# Patient Record
Sex: FEMALE | Race: WHITE
[De-identification: names, ages, dates, MRNs, and addresses within clinical notes are randomized per-mention and may not be internally consistent; named-entity substitution may affect disease eponyms.]

---

## 2020-03-21 ENCOUNTER — HOSPITAL ENCOUNTER (EMERGENCY)
Dept: HOSPITAL 62 - ER | Age: 45
LOS: 1 days | Discharge: HOME | End: 2020-03-22
Payer: COMMERCIAL

## 2020-03-21 DIAGNOSIS — R42: Primary | ICD-10-CM

## 2020-03-21 DIAGNOSIS — I10: ICD-10-CM

## 2020-03-21 DIAGNOSIS — J45.909: ICD-10-CM

## 2020-03-21 DIAGNOSIS — R06.02: ICD-10-CM

## 2020-03-21 DIAGNOSIS — Z79.899: ICD-10-CM

## 2020-03-21 DIAGNOSIS — R07.9: ICD-10-CM

## 2020-03-21 PROCEDURE — 96375 TX/PRO/DX INJ NEW DRUG ADDON: CPT

## 2020-03-21 PROCEDURE — 36415 COLL VENOUS BLD VENIPUNCTURE: CPT

## 2020-03-21 PROCEDURE — 83735 ASSAY OF MAGNESIUM: CPT

## 2020-03-21 PROCEDURE — 83880 ASSAY OF NATRIURETIC PEPTIDE: CPT

## 2020-03-21 PROCEDURE — 80053 COMPREHEN METABOLIC PANEL: CPT

## 2020-03-21 PROCEDURE — 96374 THER/PROPH/DIAG INJ IV PUSH: CPT

## 2020-03-21 PROCEDURE — 99284 EMERGENCY DEPT VISIT MOD MDM: CPT

## 2020-03-21 PROCEDURE — 85025 COMPLETE CBC W/AUTO DIFF WBC: CPT

## 2020-03-21 PROCEDURE — 71046 X-RAY EXAM CHEST 2 VIEWS: CPT

## 2020-03-21 PROCEDURE — 84703 CHORIONIC GONADOTROPIN ASSAY: CPT

## 2020-03-21 PROCEDURE — 93005 ELECTROCARDIOGRAM TRACING: CPT

## 2020-03-21 PROCEDURE — 93010 ELECTROCARDIOGRAM REPORT: CPT

## 2020-03-21 PROCEDURE — 71275 CT ANGIOGRAPHY CHEST: CPT

## 2020-03-21 PROCEDURE — 84484 ASSAY OF TROPONIN QUANT: CPT

## 2020-03-21 PROCEDURE — 84443 ASSAY THYROID STIM HORMONE: CPT

## 2020-03-21 NOTE — ER DOCUMENT REPORT
ED Medical Screen (RME)





- General


Chief Complaint: Shortness Of Breath


Stated Complaint: SHORTNESS OF BREATH, IRREGULAR PULSE


Time Seen by Provider: 20 23:39


Primary Care Provider: 


CT ALBARRAN MD [Primary Care Provider] - Follow up as needed


Notes: 





HPI: 44-year-old female presenting for chest heaviness that began when she went 

to bed tonight with a sensation of her heart beating irregularly and quickly.  

Reports shortness of breath worsened with exertional activity tonight after 

onset of events.  Patient felt fine all day, no fever or other illness.  She 

states she had a cough last week which is improving.  Heaviness does not radiate

into the back or neck.





I have greeted and performed a rapid initial assessment of this patient.  A 

comprehensive ED assessment and evaluation of the patient, analysis of test 

results and completion of the medical decision making process will be conducted 

by additional ED providers








PHYSICAL EXAMINATION:





Lung sounds are relatively clear to auscultation, she is tachycardic with an 

auscultated heart rate of 116 at rest.  Does not become dyspneic with speaking.





- Related Data


Allergies/Adverse Reactions: 


                                        





oxycodone HCl [From OxyContin] Allergy (Unknown, Verified 10/12/12 11:10)


   











Past Medical History


Pulmonary Medical History: Reports: Hx Asthma


Past Surgical History: Reports: Hx  Section





Physical Exam





- Vital signs


Vitals: 





                                        











Temp Pulse Resp BP Pulse Ox


 


 98.1 F   110 H  17   159/114 H  97 


 


 20 23:34  20 23:34  20 23:34  20 23:34  20 23:34














Course





- Vital Signs


Vital signs: 





                                        











Temp Pulse Resp BP Pulse Ox


 


 98.1 F   110 H  17   159/114 H  97 


 


 20 23:34  20 23:34  20 23:34  20 23:34  20 23:34














Doctor's Discharge





- Discharge


Referrals: 


CT ALBARRAN MD [Primary Care Provider] - Follow up as needed

## 2020-03-22 VITALS — SYSTOLIC BLOOD PRESSURE: 148 MMHG | DIASTOLIC BLOOD PRESSURE: 96 MMHG

## 2020-03-22 LAB
ADD MANUAL DIFF: NO
ALBUMIN SERPL-MCNC: 4.3 G/DL (ref 3.5–5)
ALP SERPL-CCNC: 76 U/L (ref 38–126)
ANION GAP SERPL CALC-SCNC: 10 MMOL/L (ref 5–19)
AST SERPL-CCNC: 17 U/L (ref 14–36)
BASOPHILS # BLD AUTO: 0.1 10^3/UL (ref 0–0.2)
BASOPHILS NFR BLD AUTO: 0.7 % (ref 0–2)
BILIRUB DIRECT SERPL-MCNC: 0.3 MG/DL (ref 0–0.4)
BILIRUB SERPL-MCNC: 0.3 MG/DL (ref 0.2–1.3)
BUN SERPL-MCNC: 18 MG/DL (ref 7–20)
CALCIUM: 9.6 MG/DL (ref 8.4–10.2)
CHLORIDE SERPL-SCNC: 101 MMOL/L (ref 98–107)
CO2 SERPL-SCNC: 26 MMOL/L (ref 22–30)
EOSINOPHIL # BLD AUTO: 0.2 10^3/UL (ref 0–0.6)
EOSINOPHIL NFR BLD AUTO: 1.8 % (ref 0–6)
ERYTHROCYTE [DISTWIDTH] IN BLOOD BY AUTOMATED COUNT: 14.6 % (ref 11.5–14)
GLUCOSE SERPL-MCNC: 108 MG/DL (ref 75–110)
HCT VFR BLD CALC: 43.6 % (ref 36–47)
HGB BLD-MCNC: 15.1 G/DL (ref 12–15.5)
LYMPHOCYTES # BLD AUTO: 3.4 10^3/UL (ref 0.5–4.7)
LYMPHOCYTES NFR BLD AUTO: 27.3 % (ref 13–45)
MCH RBC QN AUTO: 28.8 PG (ref 27–33.4)
MCHC RBC AUTO-ENTMCNC: 34.6 G/DL (ref 32–36)
MCV RBC AUTO: 83 FL (ref 80–97)
MONOCYTES # BLD AUTO: 0.8 10^3/UL (ref 0.1–1.4)
MONOCYTES NFR BLD AUTO: 6.3 % (ref 3–13)
NEUTROPHILS # BLD AUTO: 7.8 10^3/UL (ref 1.7–8.2)
NEUTS SEG NFR BLD AUTO: 63.9 % (ref 42–78)
PLATELET # BLD: 282 10^3/UL (ref 150–450)
POTASSIUM SERPL-SCNC: 4.3 MMOL/L (ref 3.6–5)
PROT SERPL-MCNC: 7.6 G/DL (ref 6.3–8.2)
RBC # BLD AUTO: 5.25 10^6/UL (ref 3.72–5.28)
TOTAL CELLS COUNTED % (AUTO): 100 %
WBC # BLD AUTO: 12.3 10^3/UL (ref 4–10.5)

## 2020-03-22 NOTE — ER DOCUMENT REPORT
ED General





- General


Chief Complaint: Chest Pain


Stated Complaint: SHORTNESS OF BREATH, IRREGULAR PULSE


Time Seen by Provider: 20 23:39


Primary Care Provider: 


[facility] [Provider Group] - Follow up as needed


Mode of Arrival: Ambulatory


Information source: Patient


Notes: 





44-year-old woman presents to the emergency department with a complaint of chest

heaviness, feeling as though it is difficult to get her breath and rapid 

heartbeat.  She states that symptoms began when she laid down to go to bed 

tonight.  She has never had similar episodes in the past.  She does have a 

history of asthma however, states that this feels different.  She denies chest 

pain or associated dizziness, lightheadedness.  She presents to the emergency 

department for further evaluation and treatment.





- Related Data


Allergies/Adverse Reactions: 


                                        





oxycodone HCl [From OxyContin] Allergy (Unknown, Verified 10/12/12 11:10)


   


adhesive Allergy (Verified 20 23:42)


   











Past Medical History





- Social History


Smoking Status: Never Smoker


Chew tobacco use (# tins/day): No


Frequency of alcohol use: None


Drug Abuse: None


Family History: Reviewed & Not Pertinent


Patient has suicidal ideation: No


Patient has homicidal ideation: No


Pulmonary Medical History: Reports: Hx Asthma


Past Surgical History: Reports: Hx  Section





Review of Systems





- Review of Systems


Notes: 





Constitutional: Negative for fever.


HENT: Negative for sore throat.


Eyes: Negative for visual changes.


Cardiovascular: + Chest pressure


Respiratory: + Shortness of breath.


Gastrointestinal: Negative for abdominal pain, vomiting or diarrhea.


Genitourinary: Negative for dysuria.


Musculoskeletal: Negative for back pain.


Skin: Negative for rash.


Neurological: Negative for headaches, weakness or numbness.





10 point ROS negative except as marked above and in HPI.





Physical Exam





- Vital signs


Vitals: 


                                        











Temp Pulse Resp BP Pulse Ox


 


 98.1 F   110 H  17   159/114 H  97 


 


 20 23:34  20 23:34  20 23:34  20 23:34  20 23:34














- Notes


Notes: 





PHYSICAL EXAMINATION:


 


Physical Exam:


General: Well-nourished well-developed 44-year-old woman in no acute distress


HEENT: NC/AT, pupils equal round and reactive to light, MM moist,nares clear, 

oropharynx clear, airway patent


Neck: supple, no adenopathy, no masses.  Good range of motion


Lungs: clear, no wheezing, no rales no rhonchi


CVS: Tachycardic rate and rhythm no murmur gallop or rub


Abdomen: Soft, active, nontender, no masses, no hepatosplenomegaly


Ext:   No edema, clubbing or cyanosis.


Neuro: Alert and responsive, moving all 4 extremities on command, cranial nerves

intact, no focal findings


Skin: Intact no open lesions, no rash


PSYCH: Normal mood, normal affect.


 








Course





- Re-evaluation


Re-evalutation: 





20 00:35


Patient noted to have a deep sighing and anxious affect.  IV was started the 

patient was given Lorazepam 1 mg, chest x-ray noted to be clear EKG sinus 

tachycardia with no ST or T wave abnormalities noted.  Explained to the patient 

that her symptoms appear to be anxiety/panic, however, close evaluation is being

conducted.  In the interim we will treat you symptomatically and monitor your 

response patient is in agreement with that plan.


20 02:49


CTA of the chest was performed, no pulmonary embolus, no intrapulmonary 

pathology noted.  Patient blood pressure continues to be elevated, I have 

discussed it with the patient we will start her on a low dose of blood pressure 

medication.  She also requested refills of her asthma medications albuterol 

inhaler and albuterol nebulizer solution.  Patient is being discharged home to 

follow-up with primary care physician.  We will give her a clinic for referral.





- Vital Signs


Vital signs: 


                                        











Temp Pulse Resp BP Pulse Ox


 


 98.1 F   110 H  26 H  158/108 H  97 


 


 20 23:34  20 23:34  20 01:22  20 01:22  20 01:22














- Laboratory


Result Diagrams: 


                                 20 23:57





                                 20 23:57


Laboratory results interpreted by me: 


                                        











  20





  23:57 23:57


 


WBC  12.3 H 


 


RDW  14.6 H 


 


TSH   4.77 H














- Diagnostic Test


Radiology reviewed: Image reviewed, Reports reviewed - Chest x-ray: No acute 

cardiopulmonary findings.  CTA chest: No acute pulmonary embolus, no 

intrapulmonary pathology noted.





- EKG Interpretation by Me


EKG shows normal: Sinus rhythm, Intervals


Rate: Tachycardia - Sinus tachycardia, rate 106, no acute ST or T wave abnorma

lities seen.





Discharge





- Discharge


Clinical Impression: 


 Dizziness, Chest pressure





Hypertension


Qualifiers:


 Hypertension type: unspecified Qualified Code(s): I10 - Essential (primary) 

hypertension





Condition: Good


Disposition: HOME, SELF-CARE


Instructions:  High Blood Pressure, Requiring Treatment ([facility])


Additional Instructions: 


You were treated for elevated blood pressure in the emergency department 

tonight.  You are being given prescriptions for a blood pressure medication.  

Obtain a blood pressure cuff from your pharmacy and monitor the blood pressure 

closely.  Follow-up with a primary care physician, you will be provided with the

name of the clinic that you may follow-up with.





HOME CARE INSTRUCTIONS & INFORMATION:  Thank you for choosing us for your 

medical needs. We hope you're satisfied with the care you received.  After you 

leave, you must properly care for your problem and, at the same time, observe 

its progress.  Any condition can change.  Some illnesses can change rapidly over

hours or days.  If your condition worsens, return to the Emergency Department or

see your physician promptly.





ABOUT YOUR X-RAYS AND EKG'S:   If you had an EKG or X-rays taken, they have been

read by the Emergency Physician. The X-rays and EKG's will also be read by a 

Radiologist or Cardiologist within 24 hours.  If discrepancies are noted, you 

will be notified by telephone.  Please be certain the ED has a correct telephone

number & address where you can be reached.  Also, realize that some fractures or

abnormalities do not show up on initial X-rays.  If your symptoms continue, see 

your physician.





ABOUT YOUR LABORATORY TEST:   If you had laboratory tests, the results have been

reviewed by the Emergency Physician.  Some test results (for example cultures) 

may not be available for several days.  You will be contacted if any test result

shows you need additional treatment.  Please be certain the ED has a correct 

telephone number and address where you can be reached.





ABOUT YOUR MEDICATIONS:  You will receive instructions on how to take your 

medicine on the prescription label you receive.  Additional information may be 

provided by the Pharmacy.  If you have questions afterwards, call the ED for 

clarification or further instructions.  Some prescribed medications may cause 

drowsiness.  Do not perform tasks such as driving a car or operating machinery 

without consulting your Pharmacist.  If you feel you need a refill of pain 

medication, your condition will need re-evaluation.  Please do not call for a 

refill of any medication.





ABOUT YOUR SIGNATURE:   Signature of this document acknowledges to followin. Understanding that you received emergency treatment and that you may be 

released before al medical problems are known or treated. Please be certain   

the ED has a correct phone number & address where you can be reached.


   2. Acknowledgement that you will arrange for follow-up care as recommended.


   3. Authorization for the Emergency Physician to provide information to your 

follow-up Physician in order to maximize your care.





AT ANY TIME, IF YOUR SYMPTOMS CHANGE SIGNIFICANTLY OR WORSEN OR YOU DEVELOP NEW 

SYMPTOMS, RETURN TO THE EMERGENCY DEPARTMENT IMMEDIATELY FOR RE-EVALUATION.





OUR GOAL IS TO PROVIDE EXCELLENT MEDICAL CARE!





WE HOPE THAT WE HAVE MET YOUR EXPECTATIONS DURING YOUR EMERGENCY DEPARTMENT 

VISIT AND THAT YOU FEEL YOU HAVE RECEIVED EXCELLENT CARE!











Prescriptions: 


Lisinopril [Prinivil 5 mg Tablet] 5 mg PO DAILY #20 tablet


Albuterol Sulfate [Proair HFA Inhalation Aerosol 8.5 gm MDI] 2 puff IH Q4H PRN 

#1 mdi


 PRN Reason: 


Albuterol Sulfate [Ventolin 0.083% Neb 2.5 mg/3 mL Ampul] 1 vial NEB Q4 PRN #60 

vial


 PRN Reason: Shortness Of Breath


Forms:  Elevated Blood Pressure


Referrals: 


[facility] [Provider Group] - Follow up as needed

## 2020-03-22 NOTE — RADIOLOGY REPORT (SQ)
EXAM DESCRIPTION: 



CT CHEST ANGIOGRAPHY WITHOUT THEN WITH IV CONTRAST



COMPLETED DATE/TME:  03/22/2020 01:30



CLINICAL HISTORY: 



44 years, Female, Shortness of breath



COMPARISON:

None.



TECHNIQUE:

1000  Images stored on PACS.

 

All CT scanners at this facility use dose modulation, iterative

reconstruction, and/or weight based dosing when appropriate to

reduce radiation dose to as low as reasonably achievable (ALARA).

Axial CTA images with coronal and sagittal MIPS 



[facility]: Dose Right [facility]: CareDose   [facility]: Dose Right    [facility]:

Teradose 4D    [facility]: Smart Technologies



LIMITATIONS:

Imaging was repeated secondary to suboptimal contrast bolus



FINDINGS:



On a second sequence of images, there is no large or central

pulmonary embolus. There continues to be suboptimal contrast

bolus however no definitive intraluminal filling defect. Large

portion of the bolus in the superior vena cava. Negative for

thoracic aortic aneurysm or dissection. The heart and pericardium

are unremarkable. Limited evaluation of the upper abdomen shows a

small hiatal hernia. Osseous structures are grossly intact. No

pneumothorax. Airways are patent. The lungs are clear





IMPRESSION:



Suboptimal contrast bolus. However, no convincing evidence for

pulmonary embolus. The lungs are clear

 

TECHNICAL DOCUMENTATION:



Quality ID # [id number]: Final reports with documentation of one or more

dose reduction techniques (e.g., Automated exposure control,

adjustment of the mA and/or kV according to patient size, use of

iterative reconstruction technique)



copyright 2011 [employer]- All Rights Reserved

## 2020-03-22 NOTE — RADIOLOGY REPORT (SQ)
EXAM DESCRIPTION: 







CLINICAL HISTORY: 



44 years, Female, sob



COMPARISON: 



None.





FINDINGS: 



PA and lateral chest radiographs were performed. The lungs are

well expanded and clear. The costophrenic sulci are sharp. The

cardiac silhouette, hilar regions, trachea, soft tissues and bony

structures are unremarkable.



IMPRESSION: 



No acute cardiopulmonary disease.

## 2020-03-22 NOTE — EKG REPORT
SEVERITY:- OTHERWISE NORMAL ECG -

SINUS TACHYCARDIA

:

Confirmed by: Natasha Montejo MD 22-Mar-2020 12:33:33